# Patient Record
Sex: MALE
[De-identification: names, ages, dates, MRNs, and addresses within clinical notes are randomized per-mention and may not be internally consistent; named-entity substitution may affect disease eponyms.]

---

## 2022-05-16 ENCOUNTER — HOSPITAL ENCOUNTER (INPATIENT)
Dept: HOSPITAL 5 - 3A | Age: 57
LOS: 5 days | Discharge: HOME | DRG: 885 | End: 2022-05-21
Attending: PSYCHIATRY & NEUROLOGY | Admitting: PSYCHIATRY & NEUROLOGY
Payer: MEDICARE

## 2022-05-16 DIAGNOSIS — F41.1: ICD-10-CM

## 2022-05-16 DIAGNOSIS — E44.1: ICD-10-CM

## 2022-05-16 DIAGNOSIS — F31.9: Primary | ICD-10-CM

## 2022-05-16 DIAGNOSIS — Z90.49: ICD-10-CM

## 2022-05-16 DIAGNOSIS — G47.00: ICD-10-CM

## 2022-05-16 PROCEDURE — 36415 COLL VENOUS BLD VENIPUNCTURE: CPT

## 2022-05-16 PROCEDURE — 80053 COMPREHEN METABOLIC PANEL: CPT

## 2022-05-16 PROCEDURE — 83036 HEMOGLOBIN GLYCOSYLATED A1C: CPT

## 2022-05-16 PROCEDURE — 85025 COMPLETE CBC W/AUTO DIFF WBC: CPT

## 2022-05-16 PROCEDURE — 84443 ASSAY THYROID STIM HORMONE: CPT

## 2022-05-16 PROCEDURE — 80061 LIPID PANEL: CPT

## 2022-05-17 LAB
ALBUMIN SERPL-MCNC: 3.8 G/DL (ref 3.9–5)
ALT SERPL-CCNC: 16 UNITS/L (ref 7–56)
BASOPHILS # (AUTO): 0 K/MM3 (ref 0–0.1)
BASOPHILS NFR BLD AUTO: 0.8 % (ref 0–1.8)
BUN SERPL-MCNC: 20 MG/DL (ref 9–20)
BUN/CREAT SERPL: 25 %
CALCIUM SERPL-MCNC: 8.8 MG/DL (ref 8.4–10.2)
EOSINOPHIL # BLD AUTO: 0.1 K/MM3 (ref 0–0.4)
EOSINOPHIL NFR BLD AUTO: 1.5 % (ref 0–4.3)
HCT VFR BLD CALC: 38.3 % (ref 35.5–45.6)
HDLC SERPL-MCNC: 70 MG/DL (ref 40–59)
HEMOLYSIS INDEX: 3
HGB BLD-MCNC: 12.2 GM/DL (ref 11.8–15.2)
LYMPHOCYTES # BLD AUTO: 1.8 K/MM3 (ref 1.2–5.4)
LYMPHOCYTES NFR BLD AUTO: 38 % (ref 13.4–35)
MCHC RBC AUTO-ENTMCNC: 32 % (ref 32–34)
MCV RBC AUTO: 83 FL (ref 84–94)
MONOCYTES # (AUTO): 0.3 K/MM3 (ref 0–0.8)
MONOCYTES % (AUTO): 5.6 % (ref 0–7.3)
PLATELET # BLD: 229 K/MM3 (ref 140–440)
RBC # BLD AUTO: 4.6 M/MM3 (ref 3.65–5.03)

## 2022-05-17 RX ADMIN — DIVALPROEX SODIUM SCH MG: 125 TABLET, DELAYED RELEASE ORAL at 10:00

## 2022-05-17 RX ADMIN — DIVALPROEX SODIUM SCH MG: 125 TABLET, DELAYED RELEASE ORAL at 22:08

## 2022-05-17 NOTE — CONSULTATION
History of Present Illness





- Reason for Consult


Consult date: 05/17/22


Medical management





- History of Present Illness





Patient is a 56-year-old male past medical history of bipolar disorder and 

polysubstance abuse (methamphetamines, cocaine, and marijuana) who presented to 

the ED with reported psychotic behavior such as "running in front of cars".  The

patient currently denies any suicidal ideation, hallucinations, or thoughts of 

hurting others. The hospitalist service was consulted for medical management. 





Past History


Past Medical History: other (Bipolar disorder; bladder cancer)


Past Surgical History: appendectomy, cholecystectomy, Other


Social history: single, full code, other (homelessness)


Family history: no significant family history





Medications and Allergies


                                    Allergies











Allergy/AdvReac Type Severity Reaction Status Date / Time


 


No Known Allergies Allergy   Verified 04/19/22 04:08











                                Home Medications











 Medication  Instructions  Recorded  Confirmed  Last Taken  Type


 


Oxycodone HCl/Acetaminophen 1 tab PO Q4HR PRN 04/19/22 05/17/22 Unknown History





[Percocet 7.5/325 mg]     


 


Divalproex Dr [Depakote Dr] 125 mg PO BID #60 tablet 04/22/22 05/17/22 Unknown 

Rx


 


Melatonin [Melatonin 5MG TAB] 5 mg PO QHS PRN #30 tablet 04/22/22 05/17/22 

Unknown Rx


 


clonazePAM [KlonoPIN] 0.5 mg PO BID #60 tablet 04/22/22 05/17/22 Unknown Rx











Active Meds: 


Active Medications





Clonazepam (Clonazepam 0.5 Mg Tab)  0.5 mg PO BID [facility]


   Last Admin: 05/17/22 10:00 Dose:  0.5 mg


   


Divalproex Sodium (Divalproex Dr 125 Mg Tab)  125 mg PO BID [facility]


   Last Admin: 05/17/22 10:00 Dose:  125 mg


   


Melatonin (Melatonin 5 Mg Tab)  5 mg PO QHS PRN


   PRN Reason: Insomnia


Oxycodone HCl (Oxycodone 5 Mg Tab)  2.5 mg PO Q4H PRN


   PRN Reason: Pain, Moderate (4-6)


Oxycodone/Acetaminophen (Oxycodone /Acetaminophen 5-325mg Tab)  1 tab PO Q4H PRN


   PRN Reason: Pain, Moderate (4-6)











Review of Systems


All systems: negative





Exam





- Constitutional


Vitals: 


                                        











Temp Pulse Resp BP Pulse Ox


 


 97.8 F   82   17   98/64   98 


 


 05/16/22 19:45  05/16/22 19:45  05/16/22 19:45  05/16/22 19:45  05/16/22 19:45











General appearance: Present: no acute distress, well-nourished, disheveled





- EENT


Eyes: Present: PERRL, EOM intact


ENT: hearing intact, clear oral mucosa





- Neck


Neck: Present: supple, normal ROM





- Respiratory


Respiratory effort: normal


Respiratory: bilateral: CTA





- Cardiovascular


Rhythm: regular


Heart Sounds: Present: S1 & S2





- Extremities


Extremities: no ischemia, pulses intact, pulses symmetrical, normal temperature,

normal color


Peripheral Pulses: within normal limits





- Abdominal


General gastrointestinal: Present: soft, non-tender, non-distended, normal bowel

sounds


Male genitourinary: Present: deferred





- Rectal


Rectal Exam: deferred





- Integumentary


Integumentary: Present: clear, warm, dry





- Musculoskeletal


Musculoskeletal: strength equal bilaterally





- Psychiatric


Psychiatric: appropriate mood/affect, cooperative





- Neurologic


Neurologic: CNII-XII intact





- Allied Health


Allied health notes reviewed: nursing





Results





- Labs


CBC & Chem 7: 


                                 05/17/22 07:42





                                 05/17/22 07:42


Labs: 


                              Abnormal lab results











  05/17/22 05/17/22 Range/Units





  07:42 07:42 


 


MCV  83 L   (84-94)  fl


 


MCH  26 L   (28-32)  pg


 


RDW  18.1 H   (13.2-15.2)  %


 


Lymph % (Auto)  38.0 H   (13.4-35.0)  %


 


Albumin   3.8 L  (3.9-5)  g/dL


 


HDL Cholesterol   70 H  (40-59)  mg/dL














Assessment and Plan








#Bipolar disorder


 Management per primary 





#Polysubstance dependence


-Patient engages in the following substances: Marijuana, cocaine, and 

methamphetamines


-Counseled patient about the importance of cessation of substance abuse. Offered

resources to help with quitting. Patient expresses understanding. 


-Time: +15 mins





#Mild protein caloric malnutrition


 Albumin 3.8


 Starting dietary supplementation





#Insomnia


 Continue home melatonin 5 mg nightly





#Advanced care planning


-Disease education conducted, care plan discussed, diagnoses discussed, 

prognosis discussed, and patient acknowledges understanding with care plan


-Time: +30 min





Thank you for this interesting consult.  We will continue to follow.

## 2022-05-17 NOTE — HISTORY AND PHYSICAL REPORT
GP History & Physical





- History of Present Illness


Date of admission: 05/16/22


Date of Examination: 05/17/22


Reason for Admission: Danger to self, Danger to others, Failure of Outpatient 

Treatment


Chief Complaint: Psychosis


History of Present Illness: 


The patient is a 56 ear old male with history of bipolar, and polysubstance 

abuse. The patient is calm, alert and oriented to self but confused. The patient

states that "someone stole my phone and my bag." Per note, " the patient 

presented to the ED with psychotic behavior, reported running in front of  

cars." The patient denies any current suicidal/homicidal ideation and denies 

hallucinations. 





PAST PSYCHIATRIC HISTORY


Diagnoses: Bipolar, polysubstance abuse


Suicide attempts or Self-harm behavior: denies


Prior psychiatric hospitalizations: Yes


Substance Abuse history:  Meth, cocaine, marijuana


Previous psychiatric medications tried: Unable to recall


Outpatient treatment: Unknown





PAST MEDICAL HISTORY: Appendectomy, Cholecystectomy, Hysterectomy,Lupus, and 

bladder cancer





Family Psychiatric History: None reported or documented





SOCIAL HISTORY


Marital Status: Single


Living Arrangements: homeless


Employment Status: Unemployed


Access to guns/weapons: Denies


Education:8th grade


History of Abuse: Denies


Legal History: Unknown








REVIEW OF SYSTEMS


Constitutional: Negative for weight loss


ENT: Negative for stridor


Respiratory: Negative for cough or hemoptysis


All other systems reviewed and are negative


 


MENTAL STATUS EXAMINATION


General Appearance and Behavior: Age appropriate, good hygiene, wearing 

appropriate clothes, good eye contact, calm, cooperative


Cooperation: Participating/engaged, but Guarded


Psychomotor Behavior: Psychomotor normal


Mood: anxious


Affect and affective range: congruent with stated mood


Thought Process: circumstantial


Thought Content: confused


Speech:Normal


Suicidal Ideation: Denies


Homicidal Ideation: Denies


Hallucinations: Denies


Delusions: None elicited


Impulse Control: Limited


Insight and Judgment: Limited insight and judgment


Memory: Limited


Attention: attentive


Orientation: Alert, oriented





 Assessment and Plan 


Bipolar disorder





Treatment Plan


 Patient admitted for inpatient psychiatric evaluation, medication adjustment 

and close monitoring


 The patient's behavior, mood, sleep and appetite will be closely monitored.


 Patient enrolled in individual and group therapeutic sessions and encouraged to

attend.


 Patient provided with a safe and structured environment.


 Patient's physical health needs will be addressed by the Hospitalist. 

Hospitalist Consulted


 Labs including CBC, CMP, Lipid profile and Hemoglobin A1C levels ordered for 

baseline reference


 Social Assessment will be completed and the  will work with 

patient and family to ensure a suitable and safe disposition


 Medication adjustment will be made as clinically indicated


  Continue home meds


 Usual Wellness Restoration/Preservation:


 - Start Trazodone 50 mg po QHS & 50 mg po QHS PRN between 10 PM & 2 AM for 

insomnia


 - Start Melatonin 5 mg po QHS to promote circadian rhythm


 The patient agreed on the treatment plan, understood the risk, benefit, 

alternative treatment, potential consequence of no treatment, and gave informed 

consent.


 Estimated days: 7


 Post hospital care: primary care provider, psychiatric provider


Case staffed with Dr. Root


Legal Status: Voluntary


Reaction to Hospitalization: Accepting





Medications and Allergies


                                    Allergies











Allergy/AdvReac Type Severity Reaction Status Date / Time


 


No Known Allergies Allergy   Verified 04/19/22 04:08











                                Home Medications











 Medication  Instructions  Recorded  Confirmed  Last Taken  Type


 


Oxycodone HCl/Acetaminophen 1 tab PO Q4HR PRN 04/19/22 05/17/22 Unknown History





[Percocet 7.5/325 mg]     


 


Divalproex Dr [Depakote Dr] 125 mg PO BID #60 tablet 04/22/22 05/17/22 Unknown 

Rx


 


Melatonin [Melatonin 5MG TAB] 5 mg PO QHS PRN #30 tablet 04/22/22 05/17/22 

Unknown Rx


 


clonazePAM [KlonoPIN] 0.5 mg PO BID #60 tablet 04/22/22 05/17/22 Unknown Rx














Results





- Results


Labs/Vitals: 


                             Laboratory Last Values











WBC  4.8 K/mm3 (4.5-11.0)   05/17/22  07:42    


 


RBC  4.60 M/mm3 (3.65-5.03)   05/17/22  07:42    


 


Hgb  12.2 gm/dl (11.8-15.2)   05/17/22  07:42    


 


Hct  38.3 % (35.5-45.6)   05/17/22  07:42    


 


MCV  83 fl (84-94)  L  05/17/22  07:42    


 


MCH  26 pg (28-32)  L  05/17/22  07:42    


 


MCHC  32 % (32-34)   05/17/22  07:42    


 


RDW  18.1 % (13.2-15.2)  H  05/17/22  07:42    


 


Plt Count  229 K/mm3 (140-440)   05/17/22  07:42    


 


Lymph % (Auto)  38.0 % (13.4-35.0)  H  05/17/22  07:42    


 


Mono % (Auto)  5.6 % (0.0-7.3)   05/17/22  07:42    


 


Eos % (Auto)  1.5 % (0.0-4.3)   05/17/22  07:42    


 


Baso % (Auto)  0.8 % (0.0-1.8)   05/17/22  07:42    


 


Lymph # (Auto)  1.8 K/mm3 (1.2-5.4)   05/17/22  07:42    


 


Mono # (Auto)  0.3 K/mm3 (0.0-0.8)   05/17/22  07:42    


 


Eos # (Auto)  0.1 K/mm3 (0.0-0.4)   05/17/22  07:42    


 


Baso # (Auto)  0.0 K/mm3 (0.0-0.1)   05/17/22  07:42    


 


Seg Neutrophils %  54.1 % (40.0-70.0)   05/17/22  07:42    


 


Seg Neutrophils #  2.6 K/mm3 (1.8-7.7)   05/17/22  07:42    


 


Hemoglobin A1c  5.8 % (4-6)   05/17/22  07:42    








                                Last Vital Signs











Temp  97.8 F   05/16/22 19:45


 


Pulse  82   05/16/22 19:45


 


Resp  17   05/16/22 19:45


 


BP  98/64   05/16/22 19:45


 


Pulse Ox  98   05/16/22 19:45














Physical Examination





- Constitutional


Vitals: 


                                   Vital Signs











Temp Pulse Resp BP Pulse Ox


 


 97.8 F   82   17   98/64   98 


 


 05/16/22 19:45  05/16/22 19:45  05/16/22 19:45  05/16/22 19:45  05/16/22 19:45








                           Temperature -Last 24 Hours











Temperature                    97.8 F

















Mental Status Exam





- Vital signs


                                Last Vital Signs











Temp  97.8 F   05/16/22 19:45


 


Pulse  82   05/16/22 19:45


 


Resp  17   05/16/22 19:45


 


BP  98/64   05/16/22 19:45


 


Pulse Ox  98   05/16/22 19:45














Physician Certification





- Certification Statement


Physician Certification Statement: 


This is an acknowledgement statement that GAIL RODRIGUEZ is a 56 year old M 

who requires inpatient psychiatric admission for treatment which could 

reasonably be expected to improve the patient's condition for 





Estimated period of time patient will need to remain in the hospital: [ ]





Plan for post-hospital care: [ ]

## 2022-05-18 RX ADMIN — DIVALPROEX SODIUM SCH MG: 125 TABLET, DELAYED RELEASE ORAL at 21:46

## 2022-05-18 RX ADMIN — Medication PRN MG: at 21:46

## 2022-05-18 RX ADMIN — DIVALPROEX SODIUM SCH MG: 125 TABLET, DELAYED RELEASE ORAL at 09:08

## 2022-05-18 NOTE — PROGRESS NOTE
Subjective


Date of service: 05/18/22


Subjective Comment: 


The patient was seen this morning. He reports doing well " I'm resting up." He 

reports sleep and appetite as good. The patient denies any current 

suicidal/homicidal ideation and denies hallucinations. No changes made today. 





REVIEW OF SYSTEMS


Constitutional: Negative for weight loss


ENT: Negative for stridor


Respiratory: Negative for cough or hemoptysis


All other systems reviewed and are negative


 


MENTAL STATUS EXAMINATION


General Appearance and Behavior: Age appropriate, good hygiene, wearing 

appropriate clothes, good eye contact, calm, cooperative


Cooperation: Participating/engaged, but Guarded


Psychomotor Behavior: Psychomotor normal


Mood: Calm


Affect and affective range: congruent with stated mood


Thought Process: Goal directed


Thought Content: Reality oriented


Speech:Normal


Suicidal Ideation: Denies


Homicidal Ideation: Denies


Hallucinations: Denies


Delusions: None elicited


Impulse Control: Limited


Insight and Judgment: Limited insight and judgment


Memory: Limited


Attention: attentive


Orientation: Alert, oriented





 Assessment and Plan 


Bipolar disorder





Treatment Plan


 Patient admitted for inpatient psychiatric evaluation, medication adjustment 

and close monitoring


 The patient's behavior, mood, sleep and appetite will be closely monitored.


 Patient enrolled in individual and group therapeutic sessions and encouraged to

attend.


 Patient provided with a safe and structured environment.


 Patient's physical health needs will be addressed by the Hospitalist. 

Hospitalist Consulted


 Labs including CBC, CMP, Lipid profile and Hemoglobin A1C levels ordered for 

baseline reference


 Social Assessment will be completed and the  will work with 

patient and family to ensure a suitable and safe disposition


 Medication adjustment will be made as clinically indicated


  Continue home meds


 Usual Wellness Restoration/Preservation:


 - Start Trazodone 50 mg po QHS & 50 mg po QHS PRN between 10 PM & 2 AM for 

insomnia


 - Start Melatonin 5 mg po QHS to promote circadian rhythm


 The patient agreed on the treatment plan, understood the risk, benefit, 

alternative treatment, potential consequence of no treatment, and gave informed 

consent.


 Estimated days:6


 Post hospital care: primary care provider, psychiatric provider


Case staffed with Dr. Root


Legal Status: Voluntary


Reaction to Hospitalization: Accepting





Medications and Allergies


                                    Allergies











Allergy/AdvReac Type Severity Reaction Status Date / Time


 


No Known Allergies Allergy   Verified 04/19/22 04:08











                                Home Medications











 Medication  Instructions  Recorded  Confirmed  Last Taken  Type


 


Oxycodone HCl/Acetaminophen 1 tab PO Q4HR PRN 04/19/22 05/17/22 Unknown History





[Percocet 7.5/325 mg]     


 


Divalproex Dr [Depakote Dr] 125 mg PO BID #60 tablet 04/22/22 05/17/22 Unknown 

Rx


 


Melatonin [Melatonin 5MG TAB] 5 mg PO QHS PRN #30 tablet 04/22/22 05/17/22 

Unknown Rx


 


clonazePAM [KlonoPIN] 0.5 mg PO BID #60 tablet 04/22/22 05/17/22 Unknown Rx











Active Meds: 


Active Medications





Clonazepam (Clonazepam 0.5 Mg Tab)  0.5 mg PO BID [facility]


   Last Admin: 05/18/22 09:08 Dose:  0.5 mg


   


Divalproex Sodium (Divalproex Dr 125 Mg Tab)  125 mg PO BID [facility]


   Last Admin: 05/18/22 09:08 Dose:  125 mg


   


Melatonin (Melatonin 5 Mg Tab)  5 mg PO QHS PRN


   PRN Reason: Insomnia


Oxycodone HCl (Oxycodone 5 Mg Tab)  2.5 mg PO Q4H PRN


   PRN Reason: Pain, Moderate (4-6)


Oxycodone/Acetaminophen (Oxycodone /Acetaminophen 5-325mg Tab)  1 tab PO Q4H PRN


   PRN Reason: Pain, Moderate (4-6)











Results





- Results


Labs/Vitals: 


                             Laboratory Last Values











WBC  4.8 K/mm3 (4.5-11.0)   05/17/22  07:42    


 


RBC  4.60 M/mm3 (3.65-5.03)   05/17/22  07:42    


 


Hgb  12.2 gm/dl (11.8-15.2)   05/17/22  07:42    


 


Hct  38.3 % (35.5-45.6)   05/17/22  07:42    


 


MCV  83 fl (84-94)  L  05/17/22  07:42    


 


MCH  26 pg (28-32)  L  05/17/22  07:42    


 


MCHC  32 % (32-34)   05/17/22  07:42    


 


RDW  18.1 % (13.2-15.2)  H  05/17/22  07:42    


 


Plt Count  229 K/mm3 (140-440)   05/17/22  07:42    


 


Lymph % (Auto)  38.0 % (13.4-35.0)  H  05/17/22  07:42    


 


Mono % (Auto)  5.6 % (0.0-7.3)   05/17/22  07:42    


 


Eos % (Auto)  1.5 % (0.0-4.3)   05/17/22  07:42    


 


Baso % (Auto)  0.8 % (0.0-1.8)   05/17/22  07:42    


 


Lymph # (Auto)  1.8 K/mm3 (1.2-5.4)   05/17/22  07:42    


 


Mono # (Auto)  0.3 K/mm3 (0.0-0.8)   05/17/22  07:42    


 


Eos # (Auto)  0.1 K/mm3 (0.0-0.4)   05/17/22  07:42    


 


Baso # (Auto)  0.0 K/mm3 (0.0-0.1)   05/17/22  07:42    


 


Seg Neutrophils %  54.1 % (40.0-70.0)   05/17/22  07:42    


 


Seg Neutrophils #  2.6 K/mm3 (1.8-7.7)   05/17/22  07:42    


 


Sodium  141 mmol/L (137-145)   05/17/22  07:42    


 


Potassium  3.8 mmol/L (3.6-5.0)   05/17/22  07:42    


 


Chloride  106.5 mmol/L ()   05/17/22  07:42    


 


Carbon Dioxide  24 mmol/L (22-30)   05/17/22  07:42    


 


Anion Gap  14 mmol/L  05/17/22  07:42    


 


BUN  20 mg/dL (9-20)   05/17/22  07:42    


 


Creatinine  0.8 mg/dL (0.8-1.3)   05/17/22  07:42    


 


Estimated GFR  > 60 ml/min  05/17/22  07:42    


 


BUN/Creatinine Ratio  25 %  05/17/22  07:42    


 


Glucose  91 mg/dL ()   05/17/22  07:42    


 


Hemoglobin A1c  5.8 % (4-6)   05/17/22  07:42    


 


Calcium  8.8 mg/dL (8.4-10.2)   05/17/22  07:42    


 


Total Bilirubin  0.20 mg/dL (0.1-1.2)   05/17/22  07:42    


 


AST  21 units/L (5-40)   05/17/22  07:42    


 


ALT  16 units/L (7-56)   05/17/22  07:42    


 


Alkaline Phosphatase  59 units/L ()   05/17/22  07:42    


 


Total Protein  6.4 g/dL (6.3-8.2)   05/17/22  07:42    


 


Albumin  3.8 g/dL (3.9-5)  L  05/17/22  07:42    


 


Albumin/Globulin Ratio  1.5 %  05/17/22  07:42    


 


Triglycerides  76 mg/dL (2-149)   05/17/22  07:42    


 


Cholesterol  135 mg/dL ()   05/17/22  07:42    


 


LDL Cholesterol Direct  50 mg/dL ()   05/17/22  07:42    


 


HDL Cholesterol  70 mg/dL (40-59)  H  05/17/22  07:42    


 


Cholesterol/HDL Ratio  1.92 %  05/17/22  07:42    


 


TSH  1.760 mlU/mL (0.270-4.200)   05/17/22  07:42    








                                Last Vital Signs











Temp  97.4 F L  05/18/22 10:00


 


Pulse  76   05/18/22 10:00


 


Resp  18   05/18/22 10:00


 


BP  108/69   05/18/22 10:00


 


Pulse Ox  98   05/18/22 10:00

## 2022-05-18 NOTE — PROGRESS NOTE
Assessment and Plan


Assessment and plan: 





#Bipolar disorder


 Management per primary 





#Polysubstance dependence


-Patient engages in the following substances: Marijuana, cocaine, and 

methamphetamines


-Counseled patient about the importance of cessation of substance abuse. Offered

resources to help with quitting. Patient expresses understanding. 


-Time: +15 mins





#Mild protein caloric malnutrition


 Albumin 3.8


 Starting dietary supplementation





#Insomnia


 Continue home melatonin 5 mg nightly





#Advanced care planning


-Disease education conducted, care plan discussed, diagnoses discussed, 

prognosis discussed, and patient acknowledges understanding with care plan


-Time: +30 min


Disposition Plan: Continue medical management


Total Time Spent with Patient (Minutes): 30 minutes





History


Interval history: 





No acute events overnight.





Hospitalist Physical





- Constitutional


Vitals: 


                                        











Temp Pulse Resp BP Pulse Ox


 


 97.4 F L  76   18   108/69   98 


 


 05/18/22 10:00  05/18/22 10:00  05/18/22 10:00  05/18/22 10:00  05/18/22 10:00











General appearance: Present: no acute distress, well-nourished, disheveled





- EENT


Eyes: Present: PERRL, EOM intact


ENT: hearing intact, clear oral mucosa





- Neck


Neck: Present: supple, normal ROM





- Respiratory


Respiratory effort: normal


Respiratory: bilateral: CTA





- Cardiovascular


Rhythm: regular


Heart Sounds: Present: S1 & S2





- Extremities


Extremities: no ischemia, pulses intact, pulses symmetrical, No edema, normal 

temperature, normal color


Peripheral Pulses: within normal limits





- Abdominal


General gastrointestinal: soft, non-tender, non-distended, normal bowel sounds





- Integumentary


Integumentary: Present: clear, warm, dry





- Psychiatric


Psychiatric: appropriate mood/affect, cooperative





- Neurologic


Neurologic: CNII-XII intact





- Allied Health


Allied health notes reviewed: nursing





Results





- Labs


CBC & Chem 7: 


                                 05/17/22 07:42





                                 05/17/22 07:42


Labs: 


                             Laboratory Last Values











WBC  4.8 K/mm3 (4.5-11.0)   05/17/22  07:42    


 


RBC  4.60 M/mm3 (3.65-5.03)   05/17/22  07:42    


 


Hgb  12.2 gm/dl (11.8-15.2)   05/17/22  07:42    


 


Hct  38.3 % (35.5-45.6)   05/17/22  07:42    


 


MCV  83 fl (84-94)  L  05/17/22  07:42    


 


MCH  26 pg (28-32)  L  05/17/22  07:42    


 


MCHC  32 % (32-34)   05/17/22  07:42    


 


RDW  18.1 % (13.2-15.2)  H  05/17/22  07:42    


 


Plt Count  229 K/mm3 (140-440)   05/17/22  07:42    


 


Lymph % (Auto)  38.0 % (13.4-35.0)  H  05/17/22  07:42    


 


Mono % (Auto)  5.6 % (0.0-7.3)   05/17/22  07:42    


 


Eos % (Auto)  1.5 % (0.0-4.3)   05/17/22  07:42    


 


Baso % (Auto)  0.8 % (0.0-1.8)   05/17/22  07:42    


 


Lymph # (Auto)  1.8 K/mm3 (1.2-5.4)   05/17/22  07:42    


 


Mono # (Auto)  0.3 K/mm3 (0.0-0.8)   05/17/22  07:42    


 


Eos # (Auto)  0.1 K/mm3 (0.0-0.4)   05/17/22  07:42    


 


Baso # (Auto)  0.0 K/mm3 (0.0-0.1)   05/17/22  07:42    


 


Seg Neutrophils %  54.1 % (40.0-70.0)   05/17/22  07:42    


 


Seg Neutrophils #  2.6 K/mm3 (1.8-7.7)   05/17/22  07:42    


 


Sodium  141 mmol/L (137-145)   05/17/22  07:42    


 


Potassium  3.8 mmol/L (3.6-5.0)   05/17/22  07:42    


 


Chloride  106.5 mmol/L ()   05/17/22  07:42    


 


Carbon Dioxide  24 mmol/L (22-30)   05/17/22  07:42    


 


Anion Gap  14 mmol/L  05/17/22  07:42    


 


BUN  20 mg/dL (9-20)   05/17/22  07:42    


 


Creatinine  0.8 mg/dL (0.8-1.3)   05/17/22  07:42    


 


Estimated GFR  > 60 ml/min  05/17/22  07:42    


 


BUN/Creatinine Ratio  25 %  05/17/22  07:42    


 


Glucose  91 mg/dL ()   05/17/22  07:42    


 


Hemoglobin A1c  5.8 % (4-6)   05/17/22  07:42    


 


Calcium  8.8 mg/dL (8.4-10.2)   05/17/22  07:42    


 


Total Bilirubin  0.20 mg/dL (0.1-1.2)   05/17/22  07:42    


 


AST  21 units/L (5-40)   05/17/22  07:42    


 


ALT  16 units/L (7-56)   05/17/22  07:42    


 


Alkaline Phosphatase  59 units/L ()   05/17/22  07:42    


 


Total Protein  6.4 g/dL (6.3-8.2)   05/17/22  07:42    


 


Albumin  3.8 g/dL (3.9-5)  L  05/17/22  07:42    


 


Albumin/Globulin Ratio  1.5 %  05/17/22  07:42    


 


Triglycerides  76 mg/dL (2-149)   05/17/22  07:42    


 


Cholesterol  135 mg/dL ()   05/17/22  07:42    


 


LDL Cholesterol Direct  50 mg/dL ()   05/17/22  07:42    


 


HDL Cholesterol  70 mg/dL (40-59)  H  05/17/22  07:42    


 


Cholesterol/HDL Ratio  1.92 %  05/17/22  07:42    


 


TSH  1.760 mlU/mL (0.270-4.200)   05/17/22  07:42    











Bean/IV: 


                                        





Voiding Method                   Toilet











Active Medications





- Current Medications


Current Medications: 














Generic Name Dose Route Start Last Admin





  Trade Name Freq  PRN Reason Stop Dose Admin


 


Clonazepam  0.5 mg  05/17/22 10:00  05/18/22 09:08





  Clonazepam 0.5 Mg Tab  PO   0.5 mg





  BID RICHARD   Administration


 


Divalproex Sodium  125 mg  05/17/22 10:00  05/18/22 09:08





  Divalproex Dr 125 Mg Tab  PO   125 mg





  BID RICHARD   Administration


 


Melatonin  5 mg  05/17/22 22:00 





  Melatonin 5 Mg Tab  PO  





  QHS PRN  





  Insomnia  


 


Oxycodone HCl  2.5 mg  05/17/22 09:00 





  Oxycodone 5 Mg Tab  PO  





  Q4H PRN  





  Pain, Moderate (4-6)  


 


Oxycodone/Acetaminophen  1 tab  05/17/22 09:00 





  Oxycodone /Acetaminophen 5-325mg Tab  PO  





  Q4H PRN  





  Pain, Moderate (4-6)

## 2022-05-19 RX ADMIN — Medication PRN MG: at 21:25

## 2022-05-19 RX ADMIN — DIVALPROEX SODIUM SCH MG: 125 TABLET, DELAYED RELEASE ORAL at 10:34

## 2022-05-19 RX ADMIN — DIVALPROEX SODIUM SCH MG: 125 TABLET, DELAYED RELEASE ORAL at 21:25

## 2022-05-19 NOTE — PROGRESS NOTE
Subjective


Date of service: 05/19/22


Subjective Comment: 


05/19:The patient was seen this morning. He states mood as good; rates 

depression as 4/10. The patient denies any current suicidal/homicidal ideation 

and denies hallucinations. No changes made today. 





05/18:The patient was seen this morning. He reports doing well " I'm resting 

up." He reports sleep and appetite as good. The patient denies any current 

suicidal/homicidal ideation and denies hallucinations. No changes made today. 





REVIEW OF SYSTEMS


Constitutional: Negative for weight loss


ENT: Negative for stridor


Respiratory: Negative for cough or hemoptysis


All other systems reviewed and are negative


 


MENTAL STATUS EXAMINATION


General Appearance and Behavior: Age appropriate, good hygiene, wearing 

appropriate clothes, good eye contact, calm, cooperative


Cooperation: Participating/engaged, but Guarded


Psychomotor Behavior: Psychomotor normal


Mood: Good


Affect and affective range: congruent with stated mood


Thought Process: Goal directed


Thought Content: Reality oriented


Speech:Normal


Suicidal Ideation: Denies


Homicidal Ideation: Denies


Hallucinations: Denies


Delusions: None elicited


Impulse Control: Limited


Insight and Judgment: Limited insight and judgment


Memory: Limited


Attention: attentive


Orientation: Alert, oriented





 Assessment and Plan 


Bipolar disorder





Treatment Plan


 Patient admitted for inpatient psychiatric evaluation, medication adjustment 

and close monitoring


 The patient's behavior, mood, sleep and appetite will be closely monitored.


 Patient enrolled in individual and group therapeutic sessions and encouraged to

attend.


 Patient provided with a safe and structured environment.


 Patient's physical health needs will be addressed by the Hospitalist. 

Hospitalist Consulted


 Labs including CBC, CMP, Lipid profile and Hemoglobin A1C levels ordered for 

baseline reference


 Social Assessment will be completed and the  will work with 

patient and family to ensure a suitable and safe disposition


 Medication adjustment will be made as clinically indicated


  Continue home meds


 Usual Wellness Restoration/Preservation:


 - Start Trazodone 50 mg po QHS & 50 mg po QHS PRN between 10 PM & 2 AM for 

insomnia


 - Start Melatonin 5 mg po QHS to promote circadian rhythm


 The patient agreed on the treatment plan, understood the risk, benefit, 

alternative treatment, potential consequence of no treatment, and gave informed 

consent.


 Estimated days:5


 Post hospital care: primary care provider, psychiatric provider


Case staffed with Dr. Root


Legal Status: Voluntary


Reaction to Hospitalization: Accepting





Medications and Allergies





Medications and Allergies


                                    Allergies











Allergy/AdvReac Type Severity Reaction Status Date / Time


 


No Known Allergies Allergy   Verified 04/19/22 04:08











                                Home Medications











 Medication  Instructions  Recorded  Confirmed  Last Taken  Type


 


Oxycodone HCl/Acetaminophen 1 tab PO Q4HR PRN 04/19/22 05/17/22 Unknown History





[Percocet 7.5/325 mg]     


 


Divalproex Dr [Depakote Dr] 125 mg PO BID #60 tablet 04/22/22 05/17/22 Unknown 

Rx


 


Melatonin [Melatonin 5MG TAB] 5 mg PO QHS PRN #30 tablet 04/22/22 05/17/22 

Unknown Rx


 


clonazePAM [KlonoPIN] 0.5 mg PO BID #60 tablet 04/22/22 05/17/22 Unknown Rx











Active Meds: 


Active Medications





Clonazepam (Clonazepam 0.5 Mg Tab)  0.5 mg PO BID [facility]


   Last Admin: 05/18/22 21:46 Dose:  0.5 mg


   


Divalproex Sodium (Divalproex Dr 125 Mg Tab)  125 mg PO BID [facility]


   Last Admin: 05/18/22 21:46 Dose:  125 mg


   


Melatonin (Melatonin 5 Mg Tab)  5 mg PO QHS PRN


   PRN Reason: Insomnia


   Last Admin: 05/18/22 21:46 Dose:  5 mg


   


Oxycodone HCl (Oxycodone 5 Mg Tab)  2.5 mg PO Q4H PRN


   PRN Reason: Pain, Moderate (4-6)


Oxycodone/Acetaminophen (Oxycodone /Acetaminophen 5-325mg Tab)  1 tab PO Q4H PRN


   PRN Reason: Pain, Moderate (4-6)


   Last Admin: 05/18/22 21:46 Dose:  1 tab


   











Results





- Results


Labs/Vitals: 


                             Laboratory Last Values











WBC  4.8 K/mm3 (4.5-11.0)   05/17/22  07:42    


 


RBC  4.60 M/mm3 (3.65-5.03)   05/17/22  07:42    


 


Hgb  12.2 gm/dl (11.8-15.2)   05/17/22  07:42    


 


Hct  38.3 % (35.5-45.6)   05/17/22  07:42    


 


MCV  83 fl (84-94)  L  05/17/22  07:42    


 


MCH  26 pg (28-32)  L  05/17/22  07:42    


 


MCHC  32 % (32-34)   05/17/22  07:42    


 


RDW  18.1 % (13.2-15.2)  H  05/17/22  07:42    


 


Plt Count  229 K/mm3 (140-440)   05/17/22  07:42    


 


Lymph % (Auto)  38.0 % (13.4-35.0)  H  05/17/22  07:42    


 


Mono % (Auto)  5.6 % (0.0-7.3)   05/17/22  07:42    


 


Eos % (Auto)  1.5 % (0.0-4.3)   05/17/22  07:42    


 


Baso % (Auto)  0.8 % (0.0-1.8)   05/17/22  07:42    


 


Lymph # (Auto)  1.8 K/mm3 (1.2-5.4)   05/17/22  07:42    


 


Mono # (Auto)  0.3 K/mm3 (0.0-0.8)   05/17/22  07:42    


 


Eos # (Auto)  0.1 K/mm3 (0.0-0.4)   05/17/22  07:42    


 


Baso # (Auto)  0.0 K/mm3 (0.0-0.1)   05/17/22  07:42    


 


Seg Neutrophils %  54.1 % (40.0-70.0)   05/17/22  07:42    


 


Seg Neutrophils #  2.6 K/mm3 (1.8-7.7)   05/17/22  07:42    


 


Sodium  141 mmol/L (137-145)   05/17/22  07:42    


 


Potassium  3.8 mmol/L (3.6-5.0)   05/17/22  07:42    


 


Chloride  106.5 mmol/L ()   05/17/22  07:42    


 


Carbon Dioxide  24 mmol/L (22-30)   05/17/22  07:42    


 


Anion Gap  14 mmol/L  05/17/22  07:42    


 


BUN  20 mg/dL (9-20)   05/17/22  07:42    


 


Creatinine  0.8 mg/dL (0.8-1.3)   05/17/22  07:42    


 


Estimated GFR  > 60 ml/min  05/17/22  07:42    


 


BUN/Creatinine Ratio  25 %  05/17/22  07:42    


 


Glucose  91 mg/dL ()   05/17/22  07:42    


 


Hemoglobin A1c  5.8 % (4-6)   05/17/22  07:42    


 


Calcium  8.8 mg/dL (8.4-10.2)   05/17/22  07:42    


 


Total Bilirubin  0.20 mg/dL (0.1-1.2)   05/17/22  07:42    


 


AST  21 units/L (5-40)   05/17/22  07:42    


 


ALT  16 units/L (7-56)   05/17/22  07:42    


 


Alkaline Phosphatase  59 units/L ()   05/17/22  07:42    


 


Total Protein  6.4 g/dL (6.3-8.2)   05/17/22  07:42    


 


Albumin  3.8 g/dL (3.9-5)  L  05/17/22  07:42    


 


Albumin/Globulin Ratio  1.5 %  05/17/22  07:42    


 


Triglycerides  76 mg/dL (2-149)   05/17/22  07:42    


 


Cholesterol  135 mg/dL ()   05/17/22  07:42    


 


LDL Cholesterol Direct  50 mg/dL ()   05/17/22  07:42    


 


HDL Cholesterol  70 mg/dL (40-59)  H  05/17/22  07:42    


 


Cholesterol/HDL Ratio  1.92 %  05/17/22  07:42    


 


TSH  1.760 mlU/mL (0.270-4.200)   05/17/22  07:42    








                                Last Vital Signs











Temp  97.3 F L  05/18/22 22:00


 


Pulse  78   05/18/22 22:00


 


Resp  18   05/18/22 22:00


 


BP  102/63   05/18/22 22:00


 


Pulse Ox  96   05/18/22 22:00

## 2022-05-19 NOTE — PROGRESS NOTE
Assessment and Plan


Assessment and plan: 


#Bipolar disorder


 Management per primary 





#Polysubstance dependence


-Patient engages in the following substances: Marijuana, cocaine, and 

methamphetamines


-Counseled patient about the importance of cessation of substance abuse. Offered

resources to help with quitting. Patient expresses understanding. 


-Time: +15 mins





#Mild protein caloric malnutrition


 Albumin 3.8


 Starting dietary supplementation





#Insomnia


 Continue home melatonin 5 mg nightly





#Advanced care planning


-Disease education conducted, care plan discussed, diagnoses discussed, 

prognosis discussed, and patient acknowledges understanding with care plan


-Time: +30 min


Disposition Plan: Continue medical management


Total Time Spent with Patient (Minutes): 30 minutes





5/19: Continue supportive care as outlined above.  Continue to monitor blood 

pressure discussed with nursing staff.





History


Interval history: 


Patient seen and examined notable blood pressure he reports no symptoms at this 

time.








Hospitalist Physical





- Physical exam


Narrative exam: 


VITAL SIGNS:  Reviewed.    


GENERAL:  The patient appears normally developed, Vital signs as documented.


HEAD:  No signs of head trauma.


EYES:  Pupils are equal.  Extraocular motions intact.  


EARS:  Hearing grossly intact.


MOUTH:  Oropharynx is normal. 


NECK:  No adenopathy, no JVD.  


CHEST:  Chest with clear breath sounds bilaterally.  No wheezes, rales, or 

rhonchi.  


CARDIAC:  Regular rate and rhythm.  S1 and S2, without murmurs, gallops, or 

rubs.


VASCULAR:  No Edema.  Peripheral pulses normal and equal in all extremities.


ABDOMEN:  Soft, non tender and non distended.  No   rebound or guarding, and no 

masses palpated.   Bowel Sounds normal.


MUSCULOSKELETAL:  Good range of motion of all major joints. Extremities without 

clubbing, cyanosis or edema.  


NEUROLOGIC EXAM:  Alert and oriented x 3   No focal sensory or strength 

deficits.   Speech normal.  Follows commands.


PSYCHIATRIC:  Mood normal.


SKIN:  detail exam as documented in skin assessment








- Constitutional


Vitals: 


                                        











Temp Pulse Resp BP Pulse Ox


 


 97.2 F L  72   16   98/63   100 


 


 05/19/22 08:24  05/19/22 08:24  05/19/22 08:24  05/19/22 08:24  05/19/22 08:24











General appearance: Present: no acute distress, well-nourished, disheveled





Results





- Labs


CBC & Chem 7: 


                                 05/17/22 07:42





                                 05/17/22 07:42


Labs: 


                             Laboratory Last Values











WBC  4.8 K/mm3 (4.5-11.0)   05/17/22  07:42    


 


RBC  4.60 M/mm3 (3.65-5.03)   05/17/22  07:42    


 


Hgb  12.2 gm/dl (11.8-15.2)   05/17/22  07:42    


 


Hct  38.3 % (35.5-45.6)   05/17/22  07:42    


 


MCV  83 fl (84-94)  L  05/17/22  07:42    


 


MCH  26 pg (28-32)  L  05/17/22  07:42    


 


MCHC  32 % (32-34)   05/17/22  07:42    


 


RDW  18.1 % (13.2-15.2)  H  05/17/22  07:42    


 


Plt Count  229 K/mm3 (140-440)   05/17/22  07:42    


 


Lymph % (Auto)  38.0 % (13.4-35.0)  H  05/17/22  07:42    


 


Mono % (Auto)  5.6 % (0.0-7.3)   05/17/22  07:42    


 


Eos % (Auto)  1.5 % (0.0-4.3)   05/17/22  07:42    


 


Baso % (Auto)  0.8 % (0.0-1.8)   05/17/22  07:42    


 


Lymph # (Auto)  1.8 K/mm3 (1.2-5.4)   05/17/22  07:42    


 


Mono # (Auto)  0.3 K/mm3 (0.0-0.8)   05/17/22  07:42    


 


Eos # (Auto)  0.1 K/mm3 (0.0-0.4)   05/17/22  07:42    


 


Baso # (Auto)  0.0 K/mm3 (0.0-0.1)   05/17/22  07:42    


 


Seg Neutrophils %  54.1 % (40.0-70.0)   05/17/22  07:42    


 


Seg Neutrophils #  2.6 K/mm3 (1.8-7.7)   05/17/22  07:42    


 


Sodium  141 mmol/L (137-145)   05/17/22  07:42    


 


Potassium  3.8 mmol/L (3.6-5.0)   05/17/22  07:42    


 


Chloride  106.5 mmol/L ()   05/17/22  07:42    


 


Carbon Dioxide  24 mmol/L (22-30)   05/17/22  07:42    


 


Anion Gap  14 mmol/L  05/17/22  07:42    


 


BUN  20 mg/dL (9-20)   05/17/22  07:42    


 


Creatinine  0.8 mg/dL (0.8-1.3)   05/17/22  07:42    


 


Estimated GFR  > 60 ml/min  05/17/22  07:42    


 


BUN/Creatinine Ratio  25 %  05/17/22  07:42    


 


Glucose  91 mg/dL ()   05/17/22  07:42    


 


Hemoglobin A1c  5.8 % (4-6)   05/17/22  07:42    


 


Calcium  8.8 mg/dL (8.4-10.2)   05/17/22  07:42    


 


Total Bilirubin  0.20 mg/dL (0.1-1.2)   05/17/22  07:42    


 


AST  21 units/L (5-40)   05/17/22  07:42    


 


ALT  16 units/L (7-56)   05/17/22  07:42    


 


Alkaline Phosphatase  59 units/L ()   05/17/22  07:42    


 


Total Protein  6.4 g/dL (6.3-8.2)   05/17/22  07:42    


 


Albumin  3.8 g/dL (3.9-5)  L  05/17/22  07:42    


 


Albumin/Globulin Ratio  1.5 %  05/17/22  07:42    


 


Triglycerides  76 mg/dL (2-149)   05/17/22  07:42    


 


Cholesterol  135 mg/dL ()   05/17/22  07:42    


 


LDL Cholesterol Direct  50 mg/dL ()   05/17/22  07:42    


 


HDL Cholesterol  70 mg/dL (40-59)  H  05/17/22  07:42    


 


Cholesterol/HDL Ratio  1.92 %  05/17/22  07:42    


 


TSH  1.760 mlU/mL (0.270-4.200)   05/17/22  07:42    











Bean/IV: 


                                        





Voiding Method                   Toilet











Active Medications





- Current Medications


Current Medications: 














Generic Name Dose Route Start Last Admin





  Trade Name Freq  PRN Reason Stop Dose Admin


 


Clonazepam  0.5 mg  05/17/22 10:00  05/19/22 10:33





  Clonazepam 0.5 Mg Tab  PO   0.5 mg





  BID RICHARD   Administration


 


Divalproex Sodium  125 mg  05/17/22 10:00  05/19/22 10:34





  Divalproex Dr 125 Mg Tab  PO   125 mg





  BID RICHARD   Administration


 


Melatonin  5 mg  05/17/22 22:00  05/18/22 21:46





  Melatonin 5 Mg Tab  PO   5 mg





  QHS PRN   Administration





  Insomnia  


 


Oxycodone HCl  2.5 mg  05/17/22 09:00 





  Oxycodone 5 Mg Tab  PO  





  Q4H PRN  





  Pain, Moderate (4-6)  


 


Oxycodone/Acetaminophen  1 tab  05/17/22 09:00  05/18/22 21:46





  Oxycodone /Acetaminophen 5-325mg Tab  PO   1 tab





  Q4H PRN   Administration





  Pain, Moderate (4-6)

## 2022-05-20 VITALS — SYSTOLIC BLOOD PRESSURE: 106 MMHG | DIASTOLIC BLOOD PRESSURE: 63 MMHG

## 2022-05-20 RX ADMIN — DIVALPROEX SODIUM SCH MG: 125 TABLET, DELAYED RELEASE ORAL at 09:14

## 2022-05-20 RX ADMIN — DIVALPROEX SODIUM SCH MG: 125 TABLET, DELAYED RELEASE ORAL at 21:34

## 2022-05-20 NOTE — DISCHARGE SUMMARY
Providers





- Providers


Date of Admission: 


05/16/22 22:00





Date of discharge: 05/20/22


Attending physician: 


SAMIRA ROA MD





                                        





05/16/22 20:02


Consult to Physician [CONS] Routine 


   Comment: 


   Consulting Provider: BREANNA WEBBER


   Physician Instructions: 


   Reason For Exam: management of medical problems











Primary care physician: 


PRIMARY CARE MD








Hospitalization


Reason for admission: agitation


Admitting Diagnosis: F31.9 - BIPOLAR DISORDER, UNSPECIFIED


Hospital course: 


The patient was provided inpatient psychiatric treatment with safe and 

supportive environment, group/individual therapy, psychiatric medication, 

medication adjustment, adverse effect monitor, medical evaluation, medical 

treatment, social service assessment, social support meeting, placement 

assessment and psycho-education. The patients mood, cognition, behavior, 

motivation, compliance to treatment and appreciation on family/social support 

are improved and stabilized. At the time of discharge, the patient had no 

suicidal ideas, no homicidal ideas, no aggressive thoughts, no endangering 

behavior and no debilitating adverse effects. The patient agreed on the 

treatment plan, understood the risk, benefit, alternative treatment, potential 

consequence of no treatment, and gave informed consent.





05/20 The patient was seen today. He says he feels pretty good and is ready to 

go home. The patient denies SI/HI or hallucinations of any kind. He says he got 

upset and that's why he was admitted into the hospital. The patient says "I feel

 pretty good now though."





05/19:The patient was seen this morning. He states mood as good; rates 

depression as 4/10. The patient denies any current suicidal/homicidal ideation 

and denies hallucinations. No changes made today. 





05/18:The patient was seen this morning. He reports doing well " I'm resting 

up." He reports sleep and appetite as good. The patient denies any current 

suicidal/homicidal ideation and denies hallucinations. No changes made today. 





Disposition: 01 HOME / SELF CARE / HOMELESS


Time spent for discharge: 35


Allergies/Adverse Reactions: 


                                    Allergies





No Known Allergies Allergy (Verified 04/19/22 04:08)


   








Vital Signs: 


                                Last Vital Signs











Temp  97.4 F L  05/20/22 00:07


 


Pulse  77   05/20/22 00:07


 


Resp  18   05/20/22 00:07


 


BP  88/68   05/20/22 00:07


 


Pulse Ox  97   05/20/22 00:07











Last Lab: 


                             Laboratory Last Values











WBC  4.8 K/mm3 (4.5-11.0)   05/17/22  07:42    


 


RBC  4.60 M/mm3 (3.65-5.03)   05/17/22  07:42    


 


Hgb  12.2 gm/dl (11.8-15.2)   05/17/22  07:42    


 


Hct  38.3 % (35.5-45.6)   05/17/22  07:42    


 


MCV  83 fl (84-94)  L  05/17/22  07:42    


 


MCH  26 pg (28-32)  L  05/17/22  07:42    


 


MCHC  32 % (32-34)   05/17/22  07:42    


 


RDW  18.1 % (13.2-15.2)  H  05/17/22  07:42    


 


Plt Count  229 K/mm3 (140-440)   05/17/22  07:42    


 


Lymph % (Auto)  38.0 % (13.4-35.0)  H  05/17/22  07:42    


 


Mono % (Auto)  5.6 % (0.0-7.3)   05/17/22  07:42    


 


Eos % (Auto)  1.5 % (0.0-4.3)   05/17/22  07:42    


 


Baso % (Auto)  0.8 % (0.0-1.8)   05/17/22  07:42    


 


Lymph # (Auto)  1.8 K/mm3 (1.2-5.4)   05/17/22  07:42    


 


Mono # (Auto)  0.3 K/mm3 (0.0-0.8)   05/17/22  07:42    


 


Eos # (Auto)  0.1 K/mm3 (0.0-0.4)   05/17/22  07:42    


 


Baso # (Auto)  0.0 K/mm3 (0.0-0.1)   05/17/22  07:42    


 


Seg Neutrophils %  54.1 % (40.0-70.0)   05/17/22  07:42    


 


Seg Neutrophils #  2.6 K/mm3 (1.8-7.7)   05/17/22  07:42    


 


Sodium  141 mmol/L (137-145)   05/17/22  07:42    


 


Potassium  3.8 mmol/L (3.6-5.0)   05/17/22  07:42    


 


Chloride  106.5 mmol/L ()   05/17/22  07:42    


 


Carbon Dioxide  24 mmol/L (22-30)   05/17/22  07:42    


 


Anion Gap  14 mmol/L  05/17/22  07:42    


 


BUN  20 mg/dL (9-20)   05/17/22  07:42    


 


Creatinine  0.8 mg/dL (0.8-1.3)   05/17/22  07:42    


 


Estimated GFR  > 60 ml/min  05/17/22  07:42    


 


BUN/Creatinine Ratio  25 %  05/17/22  07:42    


 


Glucose  91 mg/dL ()   05/17/22  07:42    


 


Hemoglobin A1c  5.8 % (4-6)   05/17/22  07:42    


 


Calcium  8.8 mg/dL (8.4-10.2)   05/17/22  07:42    


 


Total Bilirubin  0.20 mg/dL (0.1-1.2)   05/17/22  07:42    


 


AST  21 units/L (5-40)   05/17/22  07:42    


 


ALT  16 units/L (7-56)   05/17/22  07:42    


 


Alkaline Phosphatase  59 units/L ()   05/17/22  07:42    


 


Total Protein  6.4 g/dL (6.3-8.2)   05/17/22  07:42    


 


Albumin  3.8 g/dL (3.9-5)  L  05/17/22  07:42    


 


Albumin/Globulin Ratio  1.5 %  05/17/22  07:42    


 


Triglycerides  76 mg/dL (2-149)   05/17/22  07:42    


 


Cholesterol  135 mg/dL ()   05/17/22  07:42    


 


LDL Cholesterol Direct  50 mg/dL ()   05/17/22  07:42    


 


HDL Cholesterol  70 mg/dL (40-59)  H  05/17/22  07:42    


 


Cholesterol/HDL Ratio  1.92 %  05/17/22  07:42    


 


TSH  1.760 mlU/mL (0.270-4.200)   05/17/22  07:42    














Core Measure Documentation





- Palliative Care


Palliative Care/ Comfort Measures: Not Applicable





- Core Measures


Any of the following diagnoses?: none





Exam





- Constitutional


Vitals: 


                                        











Temp Pulse Resp BP Pulse Ox


 


 97.4 F L  77   18   88/68   97 


 


 05/20/22 00:07  05/20/22 00:07  05/20/22 00:07  05/20/22 00:07  05/20/22 00:07











General appearance: Present: no acute distress





- EENT


Eyes: Present: PERRL, EOM intact


ENT: hearing intact, clear oral mucosa





- Neck


Neck: Present: supple, normal ROM





- Respiratory


Respiratory effort: normal





Plan


Activity: advance as tolerated


Weight Bearing Status: Weight Bear as Tolerated


Care Plan Goals: 


Maintain good and stable mental health


Plan of Treatment: 


The patient should be compliant with medications, not to use drugs and not to 

drink alcohol.The patient understands that if suicidal ideas, homicidal ideas, 

or any endangering thoughts/behavior arise, they should immediately seek for 

emergent assistance including but not limited to crisis hot line and emergency 

room.


Follow up with outpatient Psychiatrist and PCP within 7 - 14 days of discharge.


Assessment: 


Bipolar Disorder


Follow up with: 


PRIMARY CARE,MD [Primary Care Provider] - 7 Days


Prescriptions: 


Melatonin [Melatonin 5MG TAB] 5 mg PO QHS PRN #30 tablet


 PRN Reason: Insomnia


Divalproex  [Depakote Dr] 125 mg PO BID #60 tablet

## 2022-05-20 NOTE — PROGRESS NOTE
Assessment and Plan





- Patient Problems


(1) Bipolar 1 disorder


Current Visit: No   Status: Acute   


Plan to address problem: 


Continue medical management, cognitive behavioral therapy as clinically 

indicated.








(2) Generalized anxiety disorder


Current Visit: No   Status: Acute   


Plan to address problem: 


Benzodiazepine therapy as clinically indicated, supportive care.








(3) Advance care planning


Current Visit: No   Status: Acute   


Plan to address problem: 


Disease education data, care plan discussed, diagnoses discussed, prognosis 

discussed, patient is full code.  Patient knowledges understanding and agreement

with care plan, +30 minutes.








History


Interval history: 


55 YO Male with Bipolar Disorder, PSA admitted to [facility] unit for 

psychiatric stabilization.  Consult placed by Dr. Herrera for medical 

management.  Patient seen and evaluated in the patient room.   Patient resting 

comfortably.  No reported nursing events..  Patient remains at baseline level of

cognition and function.











Hospitalist Physical





- Constitutional


Vitals: 


                                        











Temp Pulse Resp BP Pulse Ox


 


 98.3 F   68   18   106/63   98 


 


 05/20/22 20:52  05/20/22 20:52  05/20/22 20:52  05/20/22 20:52  05/20/22 20:52











General appearance: Present: no acute distress





- EENT


Eyes: Present: PERRL


ENT: hearing intact





- Neck


Neck: Present: normal ROM





- Respiratory


Respiratory effort: normal


Respiratory: bilateral: CTA





- Cardiovascular


Rhythm: regular


Heart Sounds: Present: S1 & S2





- Extremities


Extremities: no ischemia


Peripheral Pulses: within normal limits





- Abdominal


General gastrointestinal: soft, non-tender, tender





- Integumentary


Integumentary: Present: clear, dry





- Psychiatric


Psychiatric: cooperative





- Neurologic


Neurologic: CNII-XII intact





Results





- Labs


CBC & Chem 7: 


                                 05/17/22 07:42





                                 05/17/22 07:42


Labs: 


                             Laboratory Last Values











WBC  4.8 K/mm3 (4.5-11.0)   05/17/22  07:42    


 


RBC  4.60 M/mm3 (3.65-5.03)   05/17/22  07:42    


 


Hgb  12.2 gm/dl (11.8-15.2)   05/17/22  07:42    


 


Hct  38.3 % (35.5-45.6)   05/17/22  07:42    


 


MCV  83 fl (84-94)  L  05/17/22  07:42    


 


MCH  26 pg (28-32)  L  05/17/22  07:42    


 


MCHC  32 % (32-34)   05/17/22  07:42    


 


RDW  18.1 % (13.2-15.2)  H  05/17/22  07:42    


 


Plt Count  229 K/mm3 (140-440)   05/17/22  07:42    


 


Lymph % (Auto)  38.0 % (13.4-35.0)  H  05/17/22  07:42    


 


Mono % (Auto)  5.6 % (0.0-7.3)   05/17/22  07:42    


 


Eos % (Auto)  1.5 % (0.0-4.3)   05/17/22  07:42    


 


Baso % (Auto)  0.8 % (0.0-1.8)   05/17/22  07:42    


 


Lymph # (Auto)  1.8 K/mm3 (1.2-5.4)   05/17/22  07:42    


 


Mono # (Auto)  0.3 K/mm3 (0.0-0.8)   05/17/22  07:42    


 


Eos # (Auto)  0.1 K/mm3 (0.0-0.4)   05/17/22  07:42    


 


Baso # (Auto)  0.0 K/mm3 (0.0-0.1)   05/17/22  07:42    


 


Seg Neutrophils %  54.1 % (40.0-70.0)   05/17/22  07:42    


 


Seg Neutrophils #  2.6 K/mm3 (1.8-7.7)   05/17/22  07:42    


 


Sodium  141 mmol/L (137-145)   05/17/22  07:42    


 


Potassium  3.8 mmol/L (3.6-5.0)   05/17/22  07:42    


 


Chloride  106.5 mmol/L ()   05/17/22  07:42    


 


Carbon Dioxide  24 mmol/L (22-30)   05/17/22  07:42    


 


Anion Gap  14 mmol/L  05/17/22  07:42    


 


BUN  20 mg/dL (9-20)   05/17/22  07:42    


 


Creatinine  0.8 mg/dL (0.8-1.3)   05/17/22  07:42    


 


Estimated GFR  > 60 ml/min  05/17/22  07:42    


 


BUN/Creatinine Ratio  25 %  05/17/22  07:42    


 


Glucose  91 mg/dL ()   05/17/22  07:42    


 


Hemoglobin A1c  5.8 % (4-6)   05/17/22  07:42    


 


Calcium  8.8 mg/dL (8.4-10.2)   05/17/22  07:42    


 


Total Bilirubin  0.20 mg/dL (0.1-1.2)   05/17/22  07:42    


 


AST  21 units/L (5-40)   05/17/22  07:42    


 


ALT  16 units/L (7-56)   05/17/22  07:42    


 


Alkaline Phosphatase  59 units/L ()   05/17/22  07:42    


 


Total Protein  6.4 g/dL (6.3-8.2)   05/17/22  07:42    


 


Albumin  3.8 g/dL (3.9-5)  L  05/17/22  07:42    


 


Albumin/Globulin Ratio  1.5 %  05/17/22  07:42    


 


Triglycerides  76 mg/dL (2-149)   05/17/22  07:42    


 


Cholesterol  135 mg/dL ()   05/17/22  07:42    


 


LDL Cholesterol Direct  50 mg/dL ()   05/17/22  07:42    


 


HDL Cholesterol  70 mg/dL (40-59)  H  05/17/22  07:42    


 


Cholesterol/HDL Ratio  1.92 %  05/17/22  07:42    


 


TSH  1.760 mlU/mL (0.270-4.200)   05/17/22  07:42    











Bean/IV: 


                                        





Voiding Method                   Toilet











Active Medications





- Current Medications


Current Medications: 














Generic Name Dose Route Start Last Admin





  Trade Name Freq  PRN Reason Stop Dose Admin


 


Clonazepam  0.5 mg  05/17/22 10:00  05/20/22 21:34





  Clonazepam 0.5 Mg Tab  PO   0.5 mg





  BID RICHARD   Administration


 


Divalproex Sodium  125 mg  05/17/22 10:00  05/20/22 21:34





  Divalproex Dr 125 Mg Tab  PO   125 mg





  BID RICHARD   Administration


 


Melatonin  5 mg  05/17/22 22:00  05/19/22 21:25





  Melatonin 5 Mg Tab  PO   5 mg





  QHS PRN   Administration





  Insomnia  


 


Oxycodone HCl  2.5 mg  05/17/22 09:00 





  Oxycodone 5 Mg Tab  PO  





  Q4H PRN  





  Pain, Moderate (4-6)  


 


Oxycodone/Acetaminophen  1 tab  05/17/22 09:00  05/18/22 21:46





  Oxycodone /Acetaminophen 5-325mg Tab  PO   1 tab





  Q4H PRN   Administration





  Pain, Moderate (4-6)

## 2022-05-21 RX ADMIN — DIVALPROEX SODIUM SCH MG: 125 TABLET, DELAYED RELEASE ORAL at 09:31

## 2022-05-21 NOTE — DISCHARGE SUMMARY
Providers





- Providers


Date of Admission: 


05/16/22 22:00





Date of discharge: 05/21/22


Attending physician: 


SAMIRA ROA MD





                                        





05/16/22 20:02


Consult to Physician [CONS] Routine 


   Comment: 


   Consulting Provider: BREANNA WEBBER


   Physician Instructions: 


   Reason For Exam: management of medical problems











Primary care physician: 


PRIMARY CARE MD








Hospitalization


Reason for admission: agitation


Admitting Diagnosis: F31.9 - BIPOLAR DISORDER, UNSPECIFIED


Condition: Stable


Hospital course: 


The patient was provided inpatient psychiatric treatment with safe and 

supportive environment, group/individual therapy, psychiatric medication, 

medication adjustment, adverse effect monitor, medical evaluation, medical 

treatment, social service assessment, social support meeting, placement 

assessment and psycho-education. The patients mood, cognition, behavior, 

motivation, compliance to treatment and appreciation on family/social support 

are improved and stabilized. At the time of discharge, the patient had no 

suicidal ideas, no homicidal ideas, no aggressive thoughts, no endangering 

behavior and no debilitating adverse effects. The patient agreed on the 

treatment plan, understood the risk, benefit, alternative treatment, potential 

consequence of no treatment, and gave informed consent.





05/21 The patient was seen today. His discharge was ordered yesterday but 

transportation service did not arrive. The patient will discharge today. He 

denies SI/HI or hallucinations of any kind. He states he feels a lot better and 

is ready to go home. 





05/20 The patient was seen today. He says he feels pretty good and is ready to 

go home. The patient denies SI/HI or hallucinations of any kind. He says he got 

upset and that's why he was admitted into the hospital. The patient says "I feel

 pretty good now though."





05/19:The patient was seen this morning. He states mood as good; rates 

depression as 4/10. The patient denies any current suicidal/homicidal ideation 

and denies hallucinations. No changes made today. 





05/18:The patient was seen this morning. He reports doing well " I'm resting 

up." He reports sleep and appetite as good. The patient denies any current 

suicidal/homicidal ideation and denies hallucinations. No changes made today. 





Disposition: 01 HOME / SELF CARE / HOMELESS


Time spent for discharge: 35


Allergies/Adverse Reactions: 


                                    Allergies





No Known Allergies Allergy (Verified 04/19/22 04:08)


   








Vital Signs: 


                                Last Vital Signs











Temp  98.3 F   05/20/22 20:52


 


Pulse  68   05/20/22 20:52


 


Resp  18   05/20/22 20:52


 


BP  106/63   05/20/22 20:52


 


Pulse Ox  98   05/20/22 20:52











Last Lab: 


                             Laboratory Last Values











WBC  4.8 K/mm3 (4.5-11.0)   05/17/22  07:42    


 


RBC  4.60 M/mm3 (3.65-5.03)   05/17/22  07:42    


 


Hgb  12.2 gm/dl (11.8-15.2)   05/17/22  07:42    


 


Hct  38.3 % (35.5-45.6)   05/17/22  07:42    


 


MCV  83 fl (84-94)  L  05/17/22  07:42    


 


MCH  26 pg (28-32)  L  05/17/22  07:42    


 


MCHC  32 % (32-34)   05/17/22  07:42    


 


RDW  18.1 % (13.2-15.2)  H  05/17/22  07:42    


 


Plt Count  229 K/mm3 (140-440)   05/17/22  07:42    


 


Lymph % (Auto)  38.0 % (13.4-35.0)  H  05/17/22  07:42    


 


Mono % (Auto)  5.6 % (0.0-7.3)   05/17/22  07:42    


 


Eos % (Auto)  1.5 % (0.0-4.3)   05/17/22  07:42    


 


Baso % (Auto)  0.8 % (0.0-1.8)   05/17/22  07:42    


 


Lymph # (Auto)  1.8 K/mm3 (1.2-5.4)   05/17/22  07:42    


 


Mono # (Auto)  0.3 K/mm3 (0.0-0.8)   05/17/22  07:42    


 


Eos # (Auto)  0.1 K/mm3 (0.0-0.4)   05/17/22  07:42    


 


Baso # (Auto)  0.0 K/mm3 (0.0-0.1)   05/17/22  07:42    


 


Seg Neutrophils %  54.1 % (40.0-70.0)   05/17/22  07:42    


 


Seg Neutrophils #  2.6 K/mm3 (1.8-7.7)   05/17/22  07:42    


 


Sodium  141 mmol/L (137-145)   05/17/22  07:42    


 


Potassium  3.8 mmol/L (3.6-5.0)   05/17/22  07:42    


 


Chloride  106.5 mmol/L ()   05/17/22  07:42    


 


Carbon Dioxide  24 mmol/L (22-30)   05/17/22  07:42    


 


Anion Gap  14 mmol/L  05/17/22  07:42    


 


BUN  20 mg/dL (9-20)   05/17/22  07:42    


 


Creatinine  0.8 mg/dL (0.8-1.3)   05/17/22  07:42    


 


Estimated GFR  > 60 ml/min  05/17/22  07:42    


 


BUN/Creatinine Ratio  25 %  05/17/22  07:42    


 


Glucose  91 mg/dL ()   05/17/22  07:42    


 


Hemoglobin A1c  5.8 % (4-6)   05/17/22  07:42    


 


Calcium  8.8 mg/dL (8.4-10.2)   05/17/22  07:42    


 


Total Bilirubin  0.20 mg/dL (0.1-1.2)   05/17/22  07:42    


 


AST  21 units/L (5-40)   05/17/22  07:42    


 


ALT  16 units/L (7-56)   05/17/22  07:42    


 


Alkaline Phosphatase  59 units/L ()   05/17/22  07:42    


 


Total Protein  6.4 g/dL (6.3-8.2)   05/17/22  07:42    


 


Albumin  3.8 g/dL (3.9-5)  L  05/17/22  07:42    


 


Albumin/Globulin Ratio  1.5 %  05/17/22  07:42    


 


Triglycerides  76 mg/dL (2-149)   05/17/22  07:42    


 


Cholesterol  135 mg/dL ()   05/17/22  07:42    


 


LDL Cholesterol Direct  50 mg/dL ()   05/17/22  07:42    


 


HDL Cholesterol  70 mg/dL (40-59)  H  05/17/22  07:42    


 


Cholesterol/HDL Ratio  1.92 %  05/17/22  07:42    


 


TSH  1.760 mlU/mL (0.270-4.200)   05/17/22  07:42    














Core Measure Documentation





- Palliative Care


Palliative Care/ Comfort Measures: Not Applicable





- Core Measures


Any of the following diagnoses?: none





Exam





- Constitutional


Vitals: 


                                        











Temp Pulse Resp BP Pulse Ox


 


 98.3 F   68   18   106/63   98 


 


 05/20/22 20:52  05/20/22 20:52  05/20/22 20:52  05/20/22 20:52  05/20/22 20:52











General appearance: Present: no acute distress, mild distress





- EENT


Eyes: Present: PERRL, EOM intact


ENT: hearing intact, clear oral mucosa





- Neck


Neck: Present: supple





- Respiratory


Respiratory effort: normal





Plan


Activity: advance as tolerated


Weight Bearing Status: Weight Bear as Tolerated


Care Plan Goals: 


Maintain good and stable mental health


Plan of Treatment: 


The patient should be compliant with medications, not to use drugs and not to 

drink alcohol.The patient understands that if suicidal ideas, homicidal ideas, 

or any endangering thoughts/behavior arise, they should immediately seek for 

emergent assistance including but not limited to crisis hot line and emergency 

room.


Follow up with outpatient Psychiatrist and PCP within 7 - 14 days of discharge.


Assessment: 


Bipolar Disorder


Follow up with: 


PRIMARY CARE,MD [Primary Care Provider] - 7 Days


Prescriptions: 


Melatonin [Melatonin 5MG TAB] 5 mg PO QHS PRN #30 tablet


 PRN Reason: Insomnia


Divalproex  [Depakote Dr] 125 mg PO BID #60 tablet

## 2022-05-21 NOTE — PROGRESS NOTE
Subjective


Date of service: 05/21/22





Medications and Allergies


                                    Allergies











Allergy/AdvReac Type Severity Reaction Status Date / Time


 


No Known Allergies Allergy   Verified 04/19/22 04:08











                                Home Medications











 Medication  Instructions  Recorded  Confirmed  Last Taken  Type


 


Oxycodone HCl/Acetaminophen 1 tab PO Q4HR PRN 04/19/22 05/17/22 Unknown History





[Percocet 7.5/325 mg]     


 


clonazePAM [KlonoPIN] 0.5 mg PO BID #60 tablet 04/22/22 05/17/22 Unknown Rx


 


Divalproex Dr [Depakote Dr] 125 mg PO BID #60 tablet 05/20/22  Unknown Rx


 


Melatonin [Melatonin 5MG TAB] 5 mg PO QHS PRN #30 tablet 05/20/22  Unknown Rx











Active Meds: 


Active Medications





Clonazepam (Clonazepam 0.5 Mg Tab)  0.5 mg PO BID [facility]


   Last Admin: 05/20/22 21:34 Dose:  0.5 mg


   


Divalproex Sodium (Divalproex Dr 125 Mg Tab)  125 mg PO BID [facility]


   Last Admin: 05/20/22 21:34 Dose:  125 mg


   


Melatonin (Melatonin 5 Mg Tab)  5 mg PO QHS PRN


   PRN Reason: Insomnia


   Last Admin: 05/19/22 21:25 Dose:  5 mg


   


Oxycodone HCl (Oxycodone 5 Mg Tab)  2.5 mg PO Q4H PRN


   PRN Reason: Pain, Moderate (4-6)


Oxycodone/Acetaminophen (Oxycodone /Acetaminophen 5-325mg Tab)  1 tab PO Q4H PRN


   PRN Reason: Pain, Moderate (4-6)


   Last Admin: 05/18/22 21:46 Dose:  1 tab


   











Results





- Results


Labs/Vitals: 


                             Laboratory Last Values











WBC  4.8 K/mm3 (4.5-11.0)   05/17/22  07:42    


 


RBC  4.60 M/mm3 (3.65-5.03)   05/17/22  07:42    


 


Hgb  12.2 gm/dl (11.8-15.2)   05/17/22  07:42    


 


Hct  38.3 % (35.5-45.6)   05/17/22  07:42    


 


MCV  83 fl (84-94)  L  05/17/22  07:42    


 


MCH  26 pg (28-32)  L  05/17/22  07:42    


 


MCHC  32 % (32-34)   05/17/22  07:42    


 


RDW  18.1 % (13.2-15.2)  H  05/17/22  07:42    


 


Plt Count  229 K/mm3 (140-440)   05/17/22  07:42    


 


Lymph % (Auto)  38.0 % (13.4-35.0)  H  05/17/22  07:42    


 


Mono % (Auto)  5.6 % (0.0-7.3)   05/17/22  07:42    


 


Eos % (Auto)  1.5 % (0.0-4.3)   05/17/22  07:42    


 


Baso % (Auto)  0.8 % (0.0-1.8)   05/17/22  07:42    


 


Lymph # (Auto)  1.8 K/mm3 (1.2-5.4)   05/17/22  07:42    


 


Mono # (Auto)  0.3 K/mm3 (0.0-0.8)   05/17/22  07:42    


 


Eos # (Auto)  0.1 K/mm3 (0.0-0.4)   05/17/22  07:42    


 


Baso # (Auto)  0.0 K/mm3 (0.0-0.1)   05/17/22  07:42    


 


Seg Neutrophils %  54.1 % (40.0-70.0)   05/17/22  07:42    


 


Seg Neutrophils #  2.6 K/mm3 (1.8-7.7)   05/17/22  07:42    


 


Sodium  141 mmol/L (137-145)   05/17/22  07:42    


 


Potassium  3.8 mmol/L (3.6-5.0)   05/17/22  07:42    


 


Chloride  106.5 mmol/L ()   05/17/22  07:42    


 


Carbon Dioxide  24 mmol/L (22-30)   05/17/22  07:42    


 


Anion Gap  14 mmol/L  05/17/22  07:42    


 


BUN  20 mg/dL (9-20)   05/17/22  07:42    


 


Creatinine  0.8 mg/dL (0.8-1.3)   05/17/22  07:42    


 


Estimated GFR  > 60 ml/min  05/17/22  07:42    


 


BUN/Creatinine Ratio  25 %  05/17/22  07:42    


 


Glucose  91 mg/dL ()   05/17/22  07:42    


 


Hemoglobin A1c  5.8 % (4-6)   05/17/22  07:42    


 


Calcium  8.8 mg/dL (8.4-10.2)   05/17/22  07:42    


 


Total Bilirubin  0.20 mg/dL (0.1-1.2)   05/17/22  07:42    


 


AST  21 units/L (5-40)   05/17/22  07:42    


 


ALT  16 units/L (7-56)   05/17/22  07:42    


 


Alkaline Phosphatase  59 units/L ()   05/17/22  07:42    


 


Total Protein  6.4 g/dL (6.3-8.2)   05/17/22  07:42    


 


Albumin  3.8 g/dL (3.9-5)  L  05/17/22  07:42    


 


Albumin/Globulin Ratio  1.5 %  05/17/22  07:42    


 


Triglycerides  76 mg/dL (2-149)   05/17/22  07:42    


 


Cholesterol  135 mg/dL ()   05/17/22  07:42    


 


LDL Cholesterol Direct  50 mg/dL ()   05/17/22  07:42    


 


HDL Cholesterol  70 mg/dL (40-59)  H  05/17/22  07:42    


 


Cholesterol/HDL Ratio  1.92 %  05/17/22  07:42    


 


TSH  1.760 mlU/mL (0.270-4.200)   05/17/22  07:42    








                                Last Vital Signs











Temp  98.3 F   05/20/22 20:52


 


Pulse  68   05/20/22 20:52


 


Resp  18   05/20/22 20:52


 


BP  106/63   05/20/22 20:52


 


Pulse Ox  98   05/20/22 20:52